# Patient Record
Sex: MALE
[De-identification: names, ages, dates, MRNs, and addresses within clinical notes are randomized per-mention and may not be internally consistent; named-entity substitution may affect disease eponyms.]

---

## 2017-02-07 PROBLEM — G56.03 BILATERAL CARPAL TUNNEL SYNDROME: Status: ACTIVE | Noted: 2017-02-07

## 2017-02-09 ENCOUNTER — HOSPITAL (OUTPATIENT)
Dept: OTHER | Age: 67
End: 2017-02-09
Attending: INTERNAL MEDICINE

## 2017-02-09 LAB
ANALYZER ANC (IANC): ABNORMAL
ANION GAP SERPL CALC-SCNC: 12 MMOL/L (ref 10–20)
BUN SERPL-MCNC: 19 MG/DL (ref 10–20)
BUN/CREAT SERPL: 19 (ref 7–25)
CALCIUM SERPL-MCNC: 8.8 MG/DL (ref 8.4–10.2)
CHLORIDE: 104 MMOL/L (ref 98–107)
CO2 SERPL-SCNC: 28 MMOL/L (ref 21–32)
CREAT SERPL-MCNC: 0.99 MG/DL (ref 0.67–1.17)
ERYTHROCYTE [DISTWIDTH] IN BLOOD: 12.2 % (ref 11–15)
GLUCOSE BLDC GLUCOMTR-MCNC: 257 MG/DL (ref 65–99)
GLUCOSE BLDC GLUCOMTR-MCNC: 271 MG/DL (ref 65–99)
GLUCOSE SERPL-MCNC: 280 MG/DL (ref 65–99)
HEMATOCRIT: 49 % (ref 39–51)
HGB BLD-MCNC: 17.7 GM/DL (ref 13–17)
MCH RBC QN AUTO: 31.9 PG (ref 26–34)
MCHC RBC AUTO-ENTMCNC: 36.1 GM/DL (ref 32–36.5)
MCV RBC AUTO: 88.4 FL (ref 78–100)
PLATELET # BLD: 167 THOUSAND/MCL (ref 140–450)
POTASSIUM SERPL-SCNC: 4.2 MMOL/L (ref 3.4–5.1)
RBC # BLD: 5.54 MILLION/MCL (ref 4.5–5.9)
SODIUM SERPL-SCNC: 140 MMOL/L (ref 135–145)
WBC # BLD: 4.4 THOUSAND/MCL (ref 4.2–11)

## 2017-02-21 PROBLEM — Z95.1 S/P CABG X 4: Status: ACTIVE | Noted: 2017-02-21

## 2017-02-21 PROBLEM — I25.10 CORONARY ARTERY DISEASE INVOLVING NATIVE CORONARY ARTERY OF NATIVE HEART WITHOUT ANGINA PECTORIS: Status: ACTIVE | Noted: 2017-02-21

## 2017-02-21 PROBLEM — I25.810 CORONARY ARTERY DISEASE INVOLVING CORONARY BYPASS GRAFT OF NATIVE HEART WITHOUT ANGINA PECTORIS: Status: ACTIVE | Noted: 2017-02-21

## 2017-02-21 PROBLEM — I25.5 ISCHEMIC CARDIOMYOPATHY: Status: ACTIVE | Noted: 2017-02-21

## 2017-02-21 PROBLEM — R94.30 CARDIAC LV EJECTION FRACTION >40%: Status: ACTIVE | Noted: 2017-02-21

## 2017-02-21 PROBLEM — I10 ESSENTIAL HYPERTENSION: Status: ACTIVE | Noted: 2017-02-21

## 2017-02-21 PROBLEM — E78.00 PURE HYPERCHOLESTEROLEMIA: Status: ACTIVE | Noted: 2017-02-21

## 2017-02-23 PROBLEM — Z98.890 S/P BILATERAL CARPAL TUNNEL RELEASE: Status: ACTIVE | Noted: 2017-02-23

## 2017-03-10 ENCOUNTER — DIAGNOSTIC TRANS (OUTPATIENT)
Dept: OTHER | Age: 67
End: 2017-03-10

## 2017-03-10 ENCOUNTER — HOSPITAL (OUTPATIENT)
Dept: OTHER | Age: 67
End: 2017-03-10
Attending: INTERNAL MEDICINE

## 2017-03-10 LAB
ANALYZER ANC (IANC): ABNORMAL
ANION GAP SERPL CALC-SCNC: 14 MMOL/L (ref 10–20)
BUN SERPL-MCNC: 24 MG/DL (ref 6–20)
BUN/CREAT SERPL: 23 (ref 7–25)
CALCIUM SERPL-MCNC: 8.8 MG/DL (ref 8.4–10.2)
CHLORIDE: 102 MMOL/L (ref 98–107)
CO2 SERPL-SCNC: 26 MMOL/L (ref 21–32)
CREAT SERPL-MCNC: 1.04 MG/DL (ref 0.67–1.17)
ERYTHROCYTE [DISTWIDTH] IN BLOOD: 12.3 % (ref 11–15)
GLUCOSE BLDC GLUCOMTR-MCNC: 177 MG/DL (ref 65–99)
GLUCOSE BLDC GLUCOMTR-MCNC: 285 MG/DL (ref 65–99)
GLUCOSE BLDC GLUCOMTR-MCNC: 312 MG/DL (ref 65–99)
GLUCOSE BLDC GLUCOMTR-MCNC: 366 MG/DL (ref 65–99)
GLUCOSE SERPL-MCNC: 310 MG/DL (ref 65–99)
HEMATOCRIT: 46.8 % (ref 39–51)
HGB BLD-MCNC: 17.3 GM/DL (ref 13–17)
MCH RBC QN AUTO: 32.3 PG (ref 26–34)
MCHC RBC AUTO-ENTMCNC: 37 GM/DL (ref 32–36.5)
MCV RBC AUTO: 87.5 FL (ref 78–100)
PLATELET # BLD: 158 THOUSAND/MCL (ref 140–450)
POTASSIUM SERPL-SCNC: 4.6 MMOL/L (ref 3.4–5.1)
RBC # BLD: 5.35 MILLION/MCL (ref 4.5–5.9)
SODIUM SERPL-SCNC: 137 MMOL/L (ref 135–145)
WBC # BLD: 4.3 THOUSAND/MCL (ref 4.2–11)

## 2017-03-11 LAB
BUN SERPL-MCNC: 19 MG/DL (ref 6–20)
BUN/CREAT SERPL: 20 (ref 7–25)
CREAT SERPL-MCNC: 0.96 MG/DL (ref 0.67–1.17)
GLUCOSE BLDC GLUCOMTR-MCNC: 166 MG/DL (ref 65–99)

## 2017-03-20 PROBLEM — I45.10 RIGHT BB BLOCK: Status: ACTIVE | Noted: 2017-03-20

## 2017-03-20 PROBLEM — Z95.5 PRESENCE OF DRUG COATED STENT IN LAD CORONARY ARTERY: Status: ACTIVE | Noted: 2017-03-20

## 2017-03-20 PROBLEM — R94.31 LEFT AXIS DEVIATION: Status: ACTIVE | Noted: 2017-03-20

## 2017-03-30 ENCOUNTER — HOSPITAL (OUTPATIENT)
Dept: OTHER | Age: 67
End: 2017-03-30
Attending: INTERNAL MEDICINE

## 2017-04-01 ENCOUNTER — HOSPITAL (OUTPATIENT)
Dept: OTHER | Age: 67
End: 2017-04-01
Attending: INTERNAL MEDICINE

## 2017-04-03 PROBLEM — Z47.89 ORTHOPEDIC AFTERCARE: Status: ACTIVE | Noted: 2017-04-03

## 2017-05-01 ENCOUNTER — HOSPITAL (OUTPATIENT)
Dept: OTHER | Age: 67
End: 2017-05-01
Attending: INTERNAL MEDICINE

## 2017-06-01 ENCOUNTER — HOSPITAL (OUTPATIENT)
Dept: OTHER | Age: 67
End: 2017-06-01
Attending: INTERNAL MEDICINE

## 2017-07-01 ENCOUNTER — HOSPITAL (OUTPATIENT)
Dept: OTHER | Age: 67
End: 2017-07-01
Attending: INTERNAL MEDICINE

## 2017-12-19 PROCEDURE — 82570 ASSAY OF URINE CREATININE: CPT | Performed by: INTERNAL MEDICINE

## 2017-12-19 PROCEDURE — 82043 UR ALBUMIN QUANTITATIVE: CPT | Performed by: INTERNAL MEDICINE

## 2018-06-15 PROCEDURE — 88305 TISSUE EXAM BY PATHOLOGIST: CPT | Performed by: INTERNAL MEDICINE

## 2018-11-28 ENCOUNTER — HOSPITAL (OUTPATIENT)
Dept: OTHER | Age: 68
End: 2018-11-28
Attending: INTERNAL MEDICINE

## 2018-11-28 LAB
ANALYZER ANC (IANC): NORMAL
ANION GAP SERPL CALC-SCNC: 14 MMOL/L (ref 10–20)
BUN SERPL-MCNC: 17 MG/DL (ref 6–20)
BUN/CREAT SERPL: 19 (ref 7–25)
CALCIUM SERPL-MCNC: 9.4 MG/DL (ref 8.4–10.2)
CHLORIDE: 102 MMOL/L (ref 98–107)
CO2 SERPL-SCNC: 30 MMOL/L (ref 21–32)
CREAT SERPL-MCNC: 0.91 MG/DL (ref 0.67–1.17)
ERYTHROCYTE [DISTWIDTH] IN BLOOD: 12 % (ref 11–15)
GLUCOSE SERPL-MCNC: 152 MG/DL (ref 65–99)
HEMATOCRIT: 49.8 % (ref 39–51)
HGB BLD-MCNC: 17 GM/DL (ref 13–17)
MCH RBC QN AUTO: 31.8 PG (ref 26–34)
MCHC RBC AUTO-ENTMCNC: 34.1 GM/DL (ref 32–36.5)
MCV RBC AUTO: 93.1 FL (ref 78–100)
NRBC (NRBCRE): 0 /100 WBC
PLATELET # BLD: 183 THOUSAND/MCL (ref 140–450)
POTASSIUM SERPL-SCNC: 5 MMOL/L (ref 3.4–5.1)
RBC # BLD: 5.35 MILLION/MCL (ref 4.5–5.9)
SODIUM SERPL-SCNC: 141 MMOL/L (ref 135–145)
WBC # BLD: 4.9 THOUSAND/MCL (ref 4.2–11)

## 2019-02-08 PROCEDURE — 82043 UR ALBUMIN QUANTITATIVE: CPT | Performed by: INTERNAL MEDICINE

## 2019-02-08 PROCEDURE — 82570 ASSAY OF URINE CREATININE: CPT | Performed by: INTERNAL MEDICINE

## 2019-12-18 ENCOUNTER — HOSPITAL ENCOUNTER (EMERGENCY)
Age: 69
Discharge: HOME | End: 2019-12-18
Attending: EMERGENCY MEDICINE
Payer: COMMERCIAL

## 2019-12-18 VITALS
HEART RATE: 82 BPM | SYSTOLIC BLOOD PRESSURE: 155 MMHG | TEMPERATURE: 98 F | OXYGEN SATURATION: 98 % | BODY MASS INDEX: 32.89 KG/M2 | DIASTOLIC BLOOD PRESSURE: 102 MMHG | HEIGHT: 68 IN | RESPIRATION RATE: 16 BRPM | WEIGHT: 217 LBS

## 2019-12-18 DIAGNOSIS — M25.461 EFFUSION OF RIGHT KNEE: Primary | ICD-10-CM

## 2019-12-18 DIAGNOSIS — M25.561 ACUTE PAIN OF RIGHT KNEE: ICD-10-CM

## 2019-12-18 PROCEDURE — 99283 EMERGENCY DEPT VISIT LOW MDM: CPT

## 2019-12-18 RX ORDER — HYDROCODONE BITARTRATE AND ACETAMINOPHEN 5; 325 MG/1; MG/1
1 TABLET ORAL EVERY 6 HOURS PRN
Qty: 10 TABLET | Refills: 0 | Status: SHIPPED | OUTPATIENT
Start: 2019-12-18 | End: 2019-12-25

## 2019-12-18 RX ORDER — HYDROCODONE BITARTRATE AND ACETAMINOPHEN 5; 325 MG/1; MG/1
1 TABLET ORAL ONCE
Status: COMPLETED | OUTPATIENT
Start: 2019-12-18 | End: 2019-12-18

## 2019-12-18 NOTE — ED INITIAL ASSESSMENT (HPI)
Right knee pain. Pt saw ortho today and had knee drained. Prescribed tramadol and amoxicillin and is complaining of pain that is not improving with tramadol.

## 2019-12-19 NOTE — ED PROVIDER NOTES
Patient Seen in: [facility] In [place]      History   Patient presents with:  Lower Extremity Injury    Stated Complaint: RIGHT KNEE PAIN,  IS ON ABX AND TRAMADOL FROM SEEING HIS MD TODAY FOR SAME    HPI    Leonardo Arana is a 28-year-old male 2 diabetes, uncontrolled, with retinopathy ([facility] [address] 9/17/2014   • Type II or unspecified type diabetes mellitus without mention of complication, not stated as uncontrolled [id number]              Past Surgical History:   Procedure Laterality Date   • ANGIOGRAM normal TMs, normal oropharynx  Neck: No tenderness, full ROM, no cervical LAD  Back: No tenderness, full ROM, negataive straight leg raise  Chest: Nontender, normal expansion  Cardio: RRR, no murmurs/clicks/rubs, capillary refill brisk, normal distal pulse mouth every 6 (six) hours as needed for Pain. Qty: 10 tablet Refills: 0    !! - Potential duplicate medications found. Please discuss with provider.

## 2020-01-30 PROBLEM — F32.5 MAJOR DEPRESSION IN REMISSION (HCC): Status: ACTIVE | Noted: 2020-01-30

## 2020-02-27 PROBLEM — M47.816 LUMBAR SPONDYLOSIS: Status: ACTIVE | Noted: 2020-02-27

## 2022-01-07 ENCOUNTER — HOSPITAL ENCOUNTER (EMERGENCY)
Age: 72
Discharge: HOME | End: 2022-01-07
Attending: EMERGENCY MEDICINE
Payer: COMMERCIAL

## 2022-01-07 VITALS
OXYGEN SATURATION: 98 % | WEIGHT: 210 LBS | DIASTOLIC BLOOD PRESSURE: 50 MMHG | BODY MASS INDEX: 31.83 KG/M2 | RESPIRATION RATE: 20 BRPM | HEART RATE: 59 BPM | HEIGHT: 68 IN | TEMPERATURE: 98 F | SYSTOLIC BLOOD PRESSURE: 111 MMHG

## 2022-01-07 DIAGNOSIS — U07.1 COVID-19: Primary | ICD-10-CM

## 2022-01-07 LAB
ATRIAL RATE: 66 BPM
P AXIS: 34 DEGREES
P-R INTERVAL: 202 MS
Q-T INTERVAL: 450 MS
QRS DURATION: 130 MS
QTC CALCULATION (BEZET): 471 MS
R AXIS: -75 DEGREES
SARS-COV-2 RNA RESP QL NAA+PROBE: DETECTED
T AXIS: 11 DEGREES
VENTRICULAR RATE: 66 BPM

## 2022-01-07 PROCEDURE — 96365 THER/PROPH/DIAG IV INF INIT: CPT

## 2022-01-07 PROCEDURE — 99284 EMERGENCY DEPT VISIT MOD MDM: CPT

## 2022-01-07 PROCEDURE — 93005 ELECTROCARDIOGRAM TRACING: CPT

## 2022-01-07 PROCEDURE — 93010 ELECTROCARDIOGRAM REPORT: CPT

## 2022-01-07 NOTE — ED PROVIDER NOTES
Patient Seen in: THE [facility] Department In [place]      History   Patient presents with:  Cough/URI    Stated Complaint: congestion, sent by pmd for covid testing    Subjective:   HPI    73-year-old male who has been having upper respiratory symp HISTORY      bilateral shoulder arthroscopy                Social History    Tobacco Use      Smoking status: Former Smoker        Packs/day: 1.00        Years: 12.00        Pack years: 12        Types: Cigarettes        Start date: 1/10/1963        Quit d and oriented to person, place, and time.               ED Course     Labs Reviewed   RAPID SARS-COV-2 BY PCR - Abnormal; Notable for the following components:       Result Value    Rapid SARS-CoV-2 by PCR Detected (*)     All other components within normal encounter diagnosis)     Disposition:  Discharge  1/7/2022  4:32 pm    Follow-up:  Janell Youssef MD  [address] [place] [address] [phone]    Schedule an appointment as soon as possible for a visit  As needed      [facility]

## 2022-01-07 NOTE — ED INITIAL ASSESSMENT (HPI)
Called PMD for head congestion today but office told him to go get a Covid test so he came here--   c/o slight cough for a couple of days-  Denies any CP or SOB

## 2022-01-17 ENCOUNTER — HOSPITAL ENCOUNTER (EMERGENCY)
Age: 72
Discharge: HOME | End: 2022-01-18
Attending: EMERGENCY MEDICINE
Payer: COMMERCIAL

## 2022-01-17 DIAGNOSIS — S20.212A CONTUSION OF RIB ON LEFT SIDE, INITIAL ENCOUNTER: Primary | ICD-10-CM

## 2022-01-17 PROCEDURE — 99283 EMERGENCY DEPT VISIT LOW MDM: CPT

## 2022-01-18 ENCOUNTER — APPOINTMENT (OUTPATIENT)
Dept: GENERAL RADIOLOGY | Age: 72
End: 2022-01-18
Attending: EMERGENCY MEDICINE
Payer: COMMERCIAL

## 2022-01-18 VITALS
TEMPERATURE: 98 F | HEART RATE: 76 BPM | HEIGHT: 69 IN | OXYGEN SATURATION: 97 % | SYSTOLIC BLOOD PRESSURE: 134 MMHG | WEIGHT: 208 LBS | DIASTOLIC BLOOD PRESSURE: 79 MMHG | BODY MASS INDEX: 30.81 KG/M2 | RESPIRATION RATE: 22 BRPM

## 2022-01-18 PROCEDURE — 71101 X-RAY EXAM UNILAT RIBS/CHEST: CPT | Performed by: EMERGENCY MEDICINE

## 2022-01-18 RX ORDER — ACETAMINOPHEN AND CODEINE PHOSPHATE 300; 30 MG/1; MG/1
1 TABLET ORAL ONCE
Status: COMPLETED | OUTPATIENT
Start: 2022-01-18 | End: 2022-01-18

## 2022-01-18 RX ORDER — ACETAMINOPHEN AND CODEINE PHOSPHATE 300; 30 MG/1; MG/1
1-2 TABLET ORAL EVERY 6 HOURS PRN
Qty: 10 TABLET | Refills: 0 | Status: SHIPPED | OUTPATIENT
Start: 2022-01-18 | End: 2022-01-23

## 2022-01-18 NOTE — ED PROVIDER NOTES
Patient Seen in: THE [facility] Emergency Department In [place]      History   Patient presents with:  Fall    Stated Complaint: Left sided rib pain that radiated towards his back. Patient fell 4 days ago.  CO*    Subjective:   HPI    80-year-old male presents COLONOSCOPY,BIOPSY N/A 5/23/2014    Procedure: COLONOSCOPY, POSSIBLE BIOPSY, POSSIBLE POLYPECTOMY 78555;  Surgeon:  Dat Colorado MD;  Location: [address] [place] [address]   • OTHER SURGICAL HISTORY      BY PASS SURGERY, LEFT AND RIGHT SHOULDER SURGERY Neuro: Alert oriented and nonfocal   Skin: no rashes or nodules    ED Course   Labs Reviewed - No data to display       X-ray of the chest and left-sided ribs  Impression:  No acute displaced rib fracture.   Chronic left posterolateral rib fracture deform

## 2022-01-18 NOTE — ED INITIAL ASSESSMENT (HPI)
Patient fell on the ice about 4 days ago. Patient c/o pain to entire left side that radiates to his back. Patient has also had DEEP at times.

## (undated) NOTE — ED AVS SNAPSHOT
Shante Reid   MRN: [id number]    Department:  [facility] in [place]   Date of Visit:  12/18/2019           Disclosure     Insurance plans vary and the physician(s) referred by the ER may not be covered by your plan.  Please conta tell this physician (or your personal doctor if your instructions are to return to your personal doctor) about any new or lasting problems. The primary care or specialist physician will see patients referred from the [facility] Emergency Department.  Carlo Charles